# Patient Record
Sex: FEMALE
[De-identification: names, ages, dates, MRNs, and addresses within clinical notes are randomized per-mention and may not be internally consistent; named-entity substitution may affect disease eponyms.]

---

## 2024-03-01 PROBLEM — Z00.00 ENCOUNTER FOR PREVENTIVE HEALTH EXAMINATION: Status: ACTIVE | Noted: 2024-03-01

## 2024-03-27 ENCOUNTER — APPOINTMENT (OUTPATIENT)
Dept: PULMONOLOGY | Facility: CLINIC | Age: 34
End: 2024-03-27

## 2024-04-04 ENCOUNTER — APPOINTMENT (OUTPATIENT)
Dept: PULMONOLOGY | Facility: CLINIC | Age: 34
End: 2024-04-04
Payer: MEDICAID

## 2024-04-04 VITALS
DIASTOLIC BLOOD PRESSURE: 79 MMHG | BODY MASS INDEX: 43.54 KG/M2 | TEMPERATURE: 97.8 F | WEIGHT: 255 LBS | SYSTOLIC BLOOD PRESSURE: 127 MMHG | HEART RATE: 79 BPM | HEIGHT: 64 IN | OXYGEN SATURATION: 99 %

## 2024-04-04 DIAGNOSIS — Z78.9 OTHER SPECIFIED HEALTH STATUS: ICD-10-CM

## 2024-04-04 DIAGNOSIS — R06.83 SNORING: ICD-10-CM

## 2024-04-04 DIAGNOSIS — R05.9 COUGH, UNSPECIFIED: ICD-10-CM

## 2024-04-04 LAB — HEMOGLOBIN: 11.3

## 2024-04-04 PROCEDURE — 71046 X-RAY EXAM CHEST 2 VIEWS: CPT

## 2024-04-04 PROCEDURE — 94010 BREATHING CAPACITY TEST: CPT

## 2024-04-04 PROCEDURE — 94727 GAS DIL/WSHOT DETER LNG VOL: CPT

## 2024-04-04 PROCEDURE — ZZZZZ: CPT

## 2024-04-04 PROCEDURE — 85018 HEMOGLOBIN: CPT | Mod: QW

## 2024-04-04 PROCEDURE — 94729 DIFFUSING CAPACITY: CPT

## 2024-04-04 PROCEDURE — 99204 OFFICE O/P NEW MOD 45 MIN: CPT | Mod: 25

## 2024-04-04 NOTE — PHYSICAL EXAM
[Well Nourished] : well nourished [Well Developed] : well developed [Low Lying Soft Palate] : low lying soft palate [No Resp Distress] : no resp distress [No Acc Muscle Use] : no acc muscle use [Clear to Auscultation Bilaterally] : clear to auscultation bilaterally [No Focal Deficits] : no focal deficits [Oriented x3] : oriented x3

## 2024-04-04 NOTE — PROCEDURE
[FreeTextEntry1] : 4/2024 normal pfts PA and lateral chest xray performed using standard projections. Bones and soft tissues structures are unremarkable.Cardiac silhouette appears normal. Lung fields are clear. No infiltrate or masses noted. Mediastinal contours are normal. IMPRESSION: no acute cardiopulmonary disease

## 2024-04-26 ENCOUNTER — APPOINTMENT (OUTPATIENT)
Dept: PULMONOLOGY | Facility: CLINIC | Age: 34
End: 2024-04-26
Payer: MEDICAID

## 2024-04-26 PROCEDURE — 95800 SLP STDY UNATTENDED: CPT

## 2024-04-28 PROCEDURE — 95800 SLP STDY UNATTENDED: CPT | Mod: 52

## 2024-05-23 ENCOUNTER — APPOINTMENT (OUTPATIENT)
Dept: PULMONOLOGY | Facility: CLINIC | Age: 34
End: 2024-05-23

## 2024-07-23 ENCOUNTER — APPOINTMENT (OUTPATIENT)
Dept: PULMONOLOGY | Facility: CLINIC | Age: 34
End: 2024-07-23
Payer: COMMERCIAL

## 2024-07-23 VITALS
DIASTOLIC BLOOD PRESSURE: 76 MMHG | HEART RATE: 76 BPM | OXYGEN SATURATION: 98 % | BODY MASS INDEX: 44.39 KG/M2 | HEIGHT: 64 IN | SYSTOLIC BLOOD PRESSURE: 126 MMHG | TEMPERATURE: 98.1 F | WEIGHT: 260 LBS

## 2024-07-23 DIAGNOSIS — G47.33 OBSTRUCTIVE SLEEP APNEA (ADULT) (PEDIATRIC): ICD-10-CM

## 2024-07-23 PROCEDURE — 99214 OFFICE O/P EST MOD 30 MIN: CPT

## 2024-07-23 PROCEDURE — G2211 COMPLEX E/M VISIT ADD ON: CPT

## 2024-07-23 NOTE — PHYSICAL EXAM
[Well Nourished] : well nourished [Well Developed] : well developed [Low Lying Soft Palate] : low lying soft palate [No Resp Distress] : no resp distress [No Acc Muscle Use] : no acc muscle use [Clear to Auscultation Bilaterally] : clear to auscultation bilaterally [No Focal Deficits] : no focal deficits [Oriented x3] : oriented x3 [No Acute Distress] : no acute distress

## 2024-07-23 NOTE — HISTORY OF PRESENT ILLNESS
[Never] : never [TextBox_4] : CLAY AC is a 33 year old female who presents to review HST results surgeon : Riya Hines [facility] seen initially for bariatric surg work up- still not yet set      sister on the phone for translation

## 2024-07-23 NOTE — PROCEDURE
[FreeTextEntry1] : 4/2024 ahi 11/hr desat 78%  previous data reviewed:  4/2024 normal pfts PA and lateral chest xray performed using standard projections. Bones and soft tissues structures are unremarkable.Cardiac silhouette appears normal. Lung fields are clear. No infiltrate or masses noted. Mediastinal contours are normal. IMPRESSION: no acute cardiopulmonary disease